# Patient Record
Sex: FEMALE | Race: WHITE | HISPANIC OR LATINO | Employment: UNEMPLOYED | URBAN - METROPOLITAN AREA
[De-identification: names, ages, dates, MRNs, and addresses within clinical notes are randomized per-mention and may not be internally consistent; named-entity substitution may affect disease eponyms.]

---

## 2023-04-04 DIAGNOSIS — F90.0 ATTENTION DEFICIT HYPERACTIVITY DISORDER (ADHD), PREDOMINANTLY INATTENTIVE TYPE: Primary | ICD-10-CM

## 2023-04-04 PROBLEM — F90.9 ADHD (ATTENTION DEFICIT HYPERACTIVITY DISORDER): Status: ACTIVE | Noted: 2023-04-04

## 2023-04-04 RX ORDER — LISDEXAMFETAMINE DIMESYLATE 60 MG/1
60 CAPSULE ORAL EVERY MORNING
Qty: 30 CAPSULE | Refills: 0 | Status: CANCELLED | OUTPATIENT
Start: 2023-04-04 | End: 2023-05-04

## 2023-04-04 NOTE — PROGRESS NOTES
She called on 3.24 wanting to switch back to Vyvanse due to side effects.  Methylphenidate was making her feel irritable and did not seem to be effective.  Before I saw this message I sent in a refill for the Focalin.  I will send in a refill for Vyvanse as well and have our staff work on the prior authorization for it as she would qualify.  I sent in a refill for her pregabalin which was picked up per my review of OARRS.  I personally reviewed the OARRS report for this patient and found no concern for abuse, dependence or diversion. Refill sent per pt request.

## 2023-04-10 ENCOUNTER — DOCUMENTATION (OUTPATIENT)
Dept: PRIMARY CARE | Facility: CLINIC | Age: 28
End: 2023-04-10
Payer: COMMERCIAL

## 2023-04-10 ENCOUNTER — TELEPHONE (OUTPATIENT)
Dept: PRIMARY CARE | Facility: CLINIC | Age: 28
End: 2023-04-10
Payer: COMMERCIAL

## 2023-04-12 ENCOUNTER — TELEPHONE (OUTPATIENT)
Dept: PHARMACY | Facility: HOSPITAL | Age: 28
End: 2023-04-12
Payer: COMMERCIAL

## 2023-04-12 NOTE — TELEPHONE ENCOUNTER
Prior authorization was submitted for Nhi Negrete on 4/12/23. Patient has been contacted for the status of their prior authorization.     Key: [id number]    Please contact clinical pharmacy with any further questions. Thank you.    Wlida Yuen, PharmD    Meds PGY1 Resident

## 2023-04-20 DIAGNOSIS — F90.9 ATTENTION DEFICIT HYPERACTIVITY DISORDER (ADHD), UNSPECIFIED ADHD TYPE: ICD-10-CM

## 2023-04-20 DIAGNOSIS — G54.0 BRACHIAL PLEXUS DISORDERS: ICD-10-CM

## 2023-04-20 RX ORDER — PREGABALIN 300 MG/1
300 CAPSULE ORAL 2 TIMES DAILY
Qty: 60 CAPSULE | Refills: 0 | Status: SHIPPED | OUTPATIENT
Start: 2023-04-20 | End: 2023-05-23

## 2023-04-20 RX ORDER — DEXMETHYLPHENIDATE HYDROCHLORIDE 30 MG/1
30 CAPSULE, EXTENDED RELEASE ORAL DAILY
Qty: 30 CAPSULE | Refills: 0 | Status: SHIPPED | OUTPATIENT
Start: 2023-04-20 | End: 2023-05-16 | Stop reason: ALTCHOICE

## 2023-04-20 NOTE — PROGRESS NOTES
Patient called requesting refills. I sent refills for dexmethylphenidate and lyrica. I personally reviewed the OARRS report for this patient and found no concern for abuse, dependence or diversion. Refill sent per pt request.  I am still trying to work on finding out what is going on with a PA that was sent to [employer] last week.

## 2023-04-21 ENCOUNTER — TELEPHONE (OUTPATIENT)
Dept: PRIMARY CARE | Facility: CLINIC | Age: 28
End: 2023-04-21
Payer: COMMERCIAL

## 2023-04-24 DIAGNOSIS — G54.0 BRACHIAL PLEXUS DISORDERS: ICD-10-CM

## 2023-05-11 RX ORDER — PREGABALIN 300 MG/1
CAPSULE ORAL
Qty: 54 CAPSULE | Refills: 1 | OUTPATIENT
Start: 2023-05-11

## 2023-05-16 ENCOUNTER — OFFICE VISIT (OUTPATIENT)
Dept: PRIMARY CARE | Facility: CLINIC | Age: 28
End: 2023-05-16
Payer: MEDICARE

## 2023-05-16 VITALS
DIASTOLIC BLOOD PRESSURE: 76 MMHG | WEIGHT: 171 LBS | HEART RATE: 76 BPM | SYSTOLIC BLOOD PRESSURE: 127 MMHG | HEIGHT: 62 IN | BODY MASS INDEX: 31.47 KG/M2

## 2023-05-16 DIAGNOSIS — F90.0 ATTENTION DEFICIT HYPERACTIVITY DISORDER (ADHD), PREDOMINANTLY INATTENTIVE TYPE: Primary | ICD-10-CM

## 2023-05-16 DIAGNOSIS — G83.24 PARALYSIS OF LEFT UPPER EXTREMITY (MULTI): ICD-10-CM

## 2023-05-16 DIAGNOSIS — F41.9 ANXIETY: ICD-10-CM

## 2023-05-16 PROCEDURE — 99214 OFFICE O/P EST MOD 30 MIN: CPT | Performed by: INTERNAL MEDICINE

## 2023-05-16 RX ORDER — MUPIROCIN 20 MG/G
OINTMENT TOPICAL
COMMUNITY
Start: 2022-08-05 | End: 2023-05-29 | Stop reason: ALTCHOICE

## 2023-05-16 RX ORDER — MELOXICAM 15 MG/1
TABLET ORAL
COMMUNITY
Start: 2023-01-06 | End: 2023-05-29 | Stop reason: ALTCHOICE

## 2023-05-16 RX ORDER — TRETINOIN 0.25 MG/G
GEL TOPICAL
COMMUNITY
Start: 2023-03-23 | End: 2023-08-21 | Stop reason: ALTCHOICE

## 2023-05-16 RX ORDER — KETOCONAZOLE 20 MG/ML
SHAMPOO, SUSPENSION TOPICAL
COMMUNITY
Start: 2021-11-15 | End: 2023-08-21 | Stop reason: ALTCHOICE

## 2023-05-16 RX ORDER — CEPHALEXIN 500 MG/1
1 CAPSULE ORAL
COMMUNITY
Start: 2022-08-05 | End: 2023-05-29 | Stop reason: ALTCHOICE

## 2023-05-16 RX ORDER — SULFAMETHOXAZOLE AND TRIMETHOPRIM 800; 160 MG/1; MG/1
1 TABLET ORAL 2 TIMES DAILY
COMMUNITY
Start: 2022-12-20 | End: 2023-05-29 | Stop reason: ALTCHOICE

## 2023-05-16 RX ORDER — SERTRALINE HYDROCHLORIDE 25 MG/1
1 TABLET, FILM COATED ORAL DAILY
COMMUNITY
Start: 2022-12-06 | End: 2023-08-21 | Stop reason: ALTCHOICE

## 2023-05-16 RX ORDER — DEXTROAMPHETAMINE SACCHARATE, AMPHETAMINE ASPARTATE MONOHYDRATE, DEXTROAMPHETAMINE SULFATE AND AMPHETAMINE SULFATE 5; 5; 5; 5 MG/1; MG/1; MG/1; MG/1
20 CAPSULE, EXTENDED RELEASE ORAL EVERY MORNING
Qty: 30 CAPSULE | Refills: 0 | Status: SHIPPED | OUTPATIENT
Start: 2023-05-16 | End: 2023-06-13 | Stop reason: ALTCHOICE

## 2023-05-16 NOTE — PROGRESS NOTES
Subjective   Nhi Negrete is a 27 y.o. female who presents for ADHD.  @[facility]@  She complains of being much more Irritable   Was getting headahces, and nausea , and experiencing dizziness  She feels this is all related to the dexmethylphenidate  She would like to change to a different medication  We tried multiple times for a PA for Vyvanse but it was not covered.     She has been feeling more anxious, feeling a bit more depressed.   She would like to begin a medication for this, although she is hesitant to .     She needs refill of her lyrica that she takes for her pain from brachial plexopathy.   OARRS:  No data recorded  I have personally reviewed the OARRS report for Nhi Negrete. I have considered the risks of abuse, dependence, addiction and diversion and I believe that it is clinically appropriate for Nhi Negrete to be prescribed this medication    Is the patient prescribed a combination of a benzodiazepine and opioid?  No    Last Urine Drug Screen / ordered today: No  Recent Results (from the past 55541 hour(s))   Amphetamine Confirm, Urine    Collection Time: 09/07/22 12:00 AM   Result Value Ref Range    Amphetamines,Urine 1,555 ng/mL    MDA, Urine <200 ng/mL    MDEA, Urine <200 ng/mL    MDMA, Urine <200 ng/mL    Methamphetamine Quant, Ur <200 ng/mL    Phentermine,Urine <200 ng/mL   Drug Screen, Urine With Reflex to Confirmation    Collection Time: 09/07/22 12:00 AM   Result Value Ref Range    DRUG SCREEN COMMENT URINE SEE BELOW     Amphetamine Screen, Urine PRESUMPTIVE POSITIVE (A) NEGATIVE    Barbiturate Screen, Urine PRESUMPTIVE NEGATIVE NEGATIVE    BENZODIAZEPINE (PRESENCE) IN URINE BY SCREEN METHOD PRESUMPTIVE NEGATIVE NEGATIVE    Cannabinoid Screen, Urine PRESUMPTIVE NEGATIVE NEGATIVE    Cocaine Screen, Urine PRESUMPTIVE NEGATIVE NEGATIVE    Fentanyl, Ur PRESUMPTIVE NEGATIVE NEGATIVE    Methadone Screen, Urine PRESUMPTIVE NEGATIVE NEGATIVE    Opiate Screen, Urine PRESUMPTIVE NEGATIVE  "NEGATIVE    Oxycodone Screen, Ur PRESUMPTIVE NEGATIVE NEGATIVE    PCP Screen, Urine PRESUMPTIVE NEGATIVE NEGATIVE     Results are as expected.     Controlled Substance Agreement:  Date of the Last Agreement: 10/05/2022  Reviewed Controlled Substance Agreement including but not limited to the benefits, risks, and alternatives to treatment with a Controlled Substance medication(s).    Stimulants:   What is the patient's goal of therapy? Treatment of ADHD symptoms  Is this being achieved with current treatment? yes    Activities of Daily Living:   Is your overall impression that this patient is benefiting (symptom reduction outweighs side effects) from stimulant therapy? Yes     1. Physical Functioning: Same  2. Family Relationship: Same  3. Social Relationship: Same  4. Mood: Worse: notes this since has been on dexmehylphenidate   5. Sleep Patterns: Same  6. Overall Function: Same    Review of Systems    Objective   /76   Pulse 76   Ht 1.575 m (5' 2\")   Wt 77.6 kg (171 lb)   BMI 31.28 kg/m²    Physical Exam  Visit Vitals  /76   Pulse 76   Ht 1.575 m (5' 2\")   Wt 77.6 kg (171 lb)   BMI 31.28 kg/m²   Smoking Status Some Days   BSA 1.84 m²      GEN: NAD  HEENT: normal  NECK: no adenopathy, no thyroid enlargment  LUNGS: CTAB  CV: reg S1/S2 no murmurs  EXT: no leg edema   Assessment/Plan   Problem List Items Addressed This Visit    None    ADHD adult: begin adderall Xr 20 mg. Told her she may need a higher dose. Recommended she contact me about her progress in 2 weeks over My Chart.   Anxiety/depression\" begin sertraline 25 mg once daily.   Left Brachial Plexopathy with chronic pain : remain on pregabalin. Radha can see me in office again in 3 months.      "

## 2023-05-16 NOTE — PATIENT INSTRUCTIONS
We will begin Adderal XR (amphetamine/dextroamphetamine) at 20 mg.  You may need a higher dose.  Pay attention to how often you are getting agitated or loosing focus.   Let me know how you are doing in 2 weeks over the MyChart.   Try using flonase or nasacort nose spray for ear symptoms.   See me again in 3 months.

## 2023-05-29 PROBLEM — G83.24: Status: ACTIVE | Noted: 2023-05-29

## 2023-05-29 PROBLEM — S52.502A CLOSED FRACTURE OF LEFT DISTAL RADIUS: Status: RESOLVED | Noted: 2019-09-19 | Resolved: 2023-05-29

## 2023-06-13 ENCOUNTER — TELEPHONE (OUTPATIENT)
Dept: PRIMARY CARE | Facility: CLINIC | Age: 28
End: 2023-06-13
Payer: MEDICARE

## 2023-06-13 DIAGNOSIS — F90.0 ATTENTION DEFICIT HYPERACTIVITY DISORDER (ADHD), PREDOMINANTLY INATTENTIVE TYPE: Primary | ICD-10-CM

## 2023-06-13 RX ORDER — DEXTROAMPHETAMINE SACCHARATE, AMPHETAMINE ASPARTATE MONOHYDRATE, DEXTROAMPHETAMINE SULFATE AND AMPHETAMINE SULFATE 7.5; 7.5; 7.5; 7.5 MG/1; MG/1; MG/1; MG/1
30 CAPSULE, EXTENDED RELEASE ORAL EVERY MORNING
Qty: 30 CAPSULE | Refills: 0 | Status: SHIPPED | OUTPATIENT
Start: 2023-06-13 | End: 2023-06-15 | Stop reason: WASHOUT

## 2023-06-15 ENCOUNTER — TELEPHONE (OUTPATIENT)
Dept: PHARMACY | Facility: HOSPITAL | Age: 28
End: 2023-06-15
Payer: MEDICARE

## 2023-06-15 RX ORDER — DEXTROAMPHETAMINE SACCHARATE, AMPHETAMINE ASPARTATE, DEXTROAMPHETAMINE SULFATE AND AMPHETAMINE SULFATE 3.75; 3.75; 3.75; 3.75 MG/1; MG/1; MG/1; MG/1
15 TABLET ORAL DAILY
Qty: 30 TABLET | Refills: 0 | Status: SHIPPED | OUTPATIENT
Start: 2023-06-15 | End: 2023-06-15 | Stop reason: WASHOUT

## 2023-06-15 RX ORDER — DEXTROAMPHETAMINE SACCHARATE, AMPHETAMINE ASPARTATE, DEXTROAMPHETAMINE SULFATE AND AMPHETAMINE SULFATE 2.5; 2.5; 2.5; 2.5 MG/1; MG/1; MG/1; MG/1
TABLET ORAL
Qty: 90 TABLET | Refills: 0 | Status: SHIPPED | OUTPATIENT
Start: 2023-06-15 | End: 2023-08-21 | Stop reason: ALTCHOICE

## 2023-06-15 NOTE — TELEPHONE ENCOUNTER
----- Message from Radhika Stearns DO sent at 6/15/2023  1:44 PM EDT -----  Regarding: FW: Your Recent Visit  Contact: [phone]  Call her and tell her that I changed her rx to the immediate release adderall. She should start out with two pills in the morning and can take one pill by mid afternoon , 2 or 3 pm . Tell her the pharmacists are working on a PA for her vyvanse.   ----- Message -----  From: Joyce Michele MA  Sent: 6/13/2023  11:33 AM EDT  To: Radhika Stearns DO  Subject: FW: Your Recent Visit                              ----- Message -----  From: Nhi Negrete  Sent: 6/13/2023   9:19 AM EDT  To: #  Subject: Your Recent Visit                                Good morning.   Can you please confirm if you’ve received my message and provide any update?   Thank you

## 2023-06-16 ENCOUNTER — TELEPHONE (OUTPATIENT)
Dept: PHARMACY | Facility: HOSPITAL | Age: 28
End: 2023-06-16
Payer: MEDICARE

## 2023-06-16 NOTE — TELEPHONE ENCOUNTER
Nhi Negrete has been approved for prior authorization for Vyvanse 60mg  . Patient has been contacted/left a voicemail for the status of their prior authorization.     Key: [id number]   Approval Duration: 1 year   Date of expiration:  6/14/024   I will notify Dr. Stearns so that she can send a prescription in for the patient.     Please contact clinical pharmacy with any further questions. Thank you.    Berna Stein, PharmD  Clinical Pharmacist  [phone]

## 2023-06-19 DIAGNOSIS — F90.9 ATTENTION DEFICIT HYPERACTIVITY DISORDER (ADHD), UNSPECIFIED ADHD TYPE: ICD-10-CM

## 2023-06-19 DIAGNOSIS — G54.0 BRACHIAL PLEXUS DISORDERS: ICD-10-CM

## 2023-06-19 RX ORDER — LISDEXAMFETAMINE DIMESYLATE 60 MG/1
60 CAPSULE ORAL DAILY
Qty: 30 CAPSULE | Refills: 0 | Status: SHIPPED | OUTPATIENT
Start: 2023-06-19 | End: 2023-07-18 | Stop reason: SDUPTHER

## 2023-06-19 RX ORDER — PREGABALIN 300 MG/1
300 CAPSULE ORAL 2 TIMES DAILY
Qty: 60 CAPSULE | Refills: 5 | Status: SHIPPED | OUTPATIENT
Start: 2023-06-19 | End: 2023-09-14

## 2023-06-19 NOTE — PROGRESS NOTES
I personally reviewed the OARRS report for this patient and found no concern for abuse, dependence or diversion. Her PA was approved for Vyvanse so RX was submitted . Refill also sent for lyrica and she has been stable on this med for some time so refills were submitted. Refill sent per pt request.

## 2023-07-18 DIAGNOSIS — F90.9 ATTENTION DEFICIT HYPERACTIVITY DISORDER (ADHD), UNSPECIFIED ADHD TYPE: ICD-10-CM

## 2023-07-18 RX ORDER — LISDEXAMFETAMINE DIMESYLATE 60 MG/1
60 CAPSULE ORAL DAILY
Qty: 30 CAPSULE | Refills: 0 | Status: SHIPPED | OUTPATIENT
Start: 2023-07-18 | End: 2023-08-18 | Stop reason: SDUPTHER

## 2023-07-19 NOTE — TELEPHONE ENCOUNTER
Pt needs refill if lisdexamfetamine 60  personally reviewed the OARRS report for this patient and found no concern for abuse, dependence or diversion. Refill sent today.

## 2023-08-18 DIAGNOSIS — F90.9 ATTENTION DEFICIT HYPERACTIVITY DISORDER (ADHD), UNSPECIFIED ADHD TYPE: ICD-10-CM

## 2023-08-18 RX ORDER — LISDEXAMFETAMINE DIMESYLATE 60 MG/1
60 CAPSULE ORAL DAILY
Qty: 30 CAPSULE | Refills: 0 | Status: SHIPPED | OUTPATIENT
Start: 2023-08-18 | End: 2023-08-21 | Stop reason: ALTCHOICE

## 2023-08-18 NOTE — TELEPHONE ENCOUNTER
Pt reqested refill of generic vyvanse. I personally reviewed the OARRS report for this patient and found no concern for abuse, dependence or diversion. Refill sent today.

## 2023-08-21 ENCOUNTER — OFFICE VISIT (OUTPATIENT)
Dept: PRIMARY CARE | Facility: CLINIC | Age: 28
End: 2023-08-21
Payer: MEDICARE

## 2023-08-21 ENCOUNTER — APPOINTMENT (OUTPATIENT)
Dept: PRIMARY CARE | Facility: CLINIC | Age: 28
End: 2023-08-21
Payer: MEDICARE

## 2023-08-21 VITALS
SYSTOLIC BLOOD PRESSURE: 93 MMHG | RESPIRATION RATE: 16 BRPM | HEIGHT: 62 IN | DIASTOLIC BLOOD PRESSURE: 38 MMHG | BODY MASS INDEX: 32.06 KG/M2 | HEART RATE: 82 BPM | WEIGHT: 174.2 LBS

## 2023-08-21 DIAGNOSIS — Z79.899 ENCOUNTER FOR MEDICATION MANAGEMENT: Primary | ICD-10-CM

## 2023-08-21 DIAGNOSIS — G54.0 BRACHIAL PLEXUS NEUROPATHY: ICD-10-CM

## 2023-08-21 DIAGNOSIS — F90.9 ATTENTION DEFICIT HYPERACTIVITY DISORDER (ADHD), UNSPECIFIED ADHD TYPE: ICD-10-CM

## 2023-08-21 PROCEDURE — 99214 OFFICE O/P EST MOD 30 MIN: CPT | Performed by: INTERNAL MEDICINE

## 2023-08-21 RX ORDER — LISDEXAMFETAMINE DIMESYLATE 60 MG/1
60 CAPSULE ORAL DAILY
Qty: 30 CAPSULE | Refills: 0 | Status: SHIPPED | OUTPATIENT
Start: 2023-08-21 | End: 2023-08-24 | Stop reason: SDUPTHER

## 2023-08-21 ASSESSMENT — PATIENT HEALTH QUESTIONNAIRE - PHQ9
1. LITTLE INTEREST OR PLEASURE IN DOING THINGS: SEVERAL DAYS
2. FEELING DOWN, DEPRESSED OR HOPELESS: SEVERAL DAYS
SUM OF ALL RESPONSES TO PHQ9 QUESTIONS 1 AND 2: 2

## 2023-08-21 ASSESSMENT — PAIN SCALES - GENERAL: PAINLEVEL: 8

## 2023-08-21 NOTE — PROGRESS NOTES
Subjective   Nhi Negrete is a 28 y.o. female who presents for Follow-up.    Here for follow up on vyvanse  Says that her pregaballin does not need a new rx yet    Says antonio castañeda was having trouble getting Vyvanse so wants it sent to the [facility] in [place]     OARRS:  Radhika Stearns, DO on 8/24/2023  2:48 PM  I have personally reviewed the OARRS report for Nhi Negrete. I have considered the risks of abuse, dependence, addiction and diversion and I believe that it is clinically appropriate for Nhi Negrete to be prescribed this medication    Is the patient prescribed a combination of a benzodiazepine and opioid?  No    Last Urine Drug Screen / ordered today: Yes  Recent Results (from the past 8760 hour(s))   Amphetamine Confirm, Urine    Collection Time: 09/07/22 12:00 AM   Result Value Ref Range    Amphetamines,Urine 1,555 ng/mL    MDA, Urine <200 ng/mL    MDEA, Urine <200 ng/mL    MDMA, Urine <200 ng/mL    Methamphetamine Quant, Ur <200 ng/mL    Phentermine,Urine <200 ng/mL   Drug Screen, Urine With Reflex to Confirmation    Collection Time: 09/07/22 12:00 AM   Result Value Ref Range    DRUG SCREEN COMMENT URINE SEE BELOW     Amphetamine Screen, Urine PRESUMPTIVE POSITIVE (A) NEGATIVE    Barbiturate Screen, Urine PRESUMPTIVE NEGATIVE NEGATIVE    BENZODIAZEPINE (PRESENCE) IN URINE BY SCREEN METHOD PRESUMPTIVE NEGATIVE NEGATIVE    Cannabinoid Screen, Urine PRESUMPTIVE NEGATIVE NEGATIVE    Cocaine Screen, Urine PRESUMPTIVE NEGATIVE NEGATIVE    Fentanyl, Ur PRESUMPTIVE NEGATIVE NEGATIVE    Methadone Screen, Urine PRESUMPTIVE NEGATIVE NEGATIVE    Opiate Screen, Urine PRESUMPTIVE NEGATIVE NEGATIVE    Oxycodone Screen, Ur PRESUMPTIVE NEGATIVE NEGATIVE    PCP Screen, Urine PRESUMPTIVE NEGATIVE NEGATIVE     Results are as expected.     Controlled Substance Agreement:  Date of the Last Agreement: 8/21/2023  Reviewed Controlled Substance Agreement including but not limited to the benefits, risks, and alternatives  "to treatment with a Controlled Substance medication(s).    Stimulants:   What is the patient's goal of therapy? Treatment of ADHD   Is this being achieved with current treatment?     Activities of Daily Living:   Is your overall impression that this patient is benefiting (symptom reduction outweighs side effects) from stimulant therapy? Yes     1. Physical Functioning: Same  2. Family Relationship: Same  3. Social Relationship: Same  4. Mood: Better  5. Sleep Patterns: Same  6. Overall Function: Better    Review of Systems    Objective   BP (!) 93/38 (BP Location: Right arm, Patient Position: Sitting, BP Cuff Size: Adult)   Pulse 82   Resp 16   Ht 1.575 m (5' 2\")   Wt 79 kg (174 lb 3.2 oz)   BMI 31.86 kg/m²    Physical Exam    Assessment/Plan   Problem List Items Addressed This Visit       ADHD (attention deficit hyperactivity disorder)   refilled vyvanse. CSA completed today and also UDS ordered.     Brachial plexopathy: remains on pregabalin.      Other Visit Diagnoses       Encounter for medication management    -  Primary    Relevant Orders    Opiate/Opioid/Benzo Extended Prescription Compliance               "

## 2023-08-21 NOTE — PATIENT INSTRUCTIONS
Lab closes from 12:30-1:00 for lunch daily.  Your order will be in the system to do your urine test.  See me in 6 months.

## 2023-08-24 DIAGNOSIS — F90.9 ATTENTION DEFICIT HYPERACTIVITY DISORDER (ADHD), UNSPECIFIED ADHD TYPE: ICD-10-CM

## 2023-08-24 RX ORDER — LISDEXAMFETAMINE DIMESYLATE 60 MG/1
60 CAPSULE ORAL DAILY
Qty: 30 CAPSULE | Refills: 0 | Status: SHIPPED | OUTPATIENT
Start: 2023-08-24 | End: 2023-09-26 | Stop reason: SDUPTHER

## 2023-08-24 NOTE — PROGRESS NOTES
She called and needs her prescription for Vyvanse transferred to [facility] pharmacy in [facility] [place].  I personally reviewed the OARRS report for this patient and found no concern for abuse, dependence or diversion.  I will send another prescription in for her Vyvanse 60 mg #30  daily to this new pharmacy.

## 2023-09-14 DIAGNOSIS — G54.0 BRACHIAL PLEXUS DISORDERS: ICD-10-CM

## 2023-09-14 RX ORDER — PREGABALIN 300 MG/1
300 CAPSULE ORAL 2 TIMES DAILY
Qty: 60 CAPSULE | Refills: 3 | Status: SHIPPED | OUTPATIENT
Start: 2023-09-14 | End: 2023-10-11

## 2023-09-14 NOTE — TELEPHONE ENCOUNTER
She is requesting refill of lyrica. I personally reviewed the OARRS report for this patient and found no concern for abuse, dependence or diversion. Refill sent today.

## 2023-09-26 DIAGNOSIS — F90.9 ATTENTION DEFICIT HYPERACTIVITY DISORDER (ADHD), UNSPECIFIED ADHD TYPE: ICD-10-CM

## 2023-09-26 RX ORDER — LISDEXAMFETAMINE DIMESYLATE 60 MG/1
60 CAPSULE ORAL DAILY
Qty: 30 CAPSULE | Refills: 0 | Status: SHIPPED | OUTPATIENT
Start: 2023-09-26 | End: 2023-11-22 | Stop reason: SDUPTHER

## 2023-09-26 NOTE — TELEPHONE ENCOUNTER
Med refill from voicemail      lisdexamfetamine (Vyvanse) 60 mg capsule   Take 1 capsule (60 mg) by mouth once daily.     30 days    [facility]    BN

## 2023-09-26 NOTE — TELEPHONE ENCOUNTER
Pt requesting refill of vyvanse. I personally reviewed the OARRS report for this patient and found no concern for abuse, dependence or diversion. Refill sent today.

## 2023-10-11 DIAGNOSIS — G54.0 BRACHIAL PLEXUS DISORDERS: ICD-10-CM

## 2023-10-11 RX ORDER — PREGABALIN 300 MG/1
300 CAPSULE ORAL 2 TIMES DAILY
Qty: 60 CAPSULE | Refills: 3 | Status: SHIPPED | OUTPATIENT
Start: 2023-10-11 | End: 2023-11-09

## 2023-10-12 NOTE — TELEPHONE ENCOUNTER
Pt requests refill of pregabalin. I personally reviewed the OARRS report for this patient and found no concern for abuse, dependence or diversion. Refill sent today.

## 2023-11-08 DIAGNOSIS — G54.0 BRACHIAL PLEXUS DISORDERS: ICD-10-CM

## 2023-11-08 NOTE — TELEPHONE ENCOUNTER
Please refill lyrica, patient needs/approves.    pregabalin (Lyrica) 300 mg capsule   TAKE 1 CAPSULE BY MOUTH TWICE A DAY   [facility]

## 2023-11-09 RX ORDER — PREGABALIN 300 MG/1
300 CAPSULE ORAL 2 TIMES DAILY
Qty: 60 CAPSULE | Refills: 3 | Status: SHIPPED | OUTPATIENT
Start: 2023-11-09 | End: 2024-03-04

## 2023-11-22 DIAGNOSIS — F90.9 ATTENTION DEFICIT HYPERACTIVITY DISORDER (ADHD), UNSPECIFIED ADHD TYPE: ICD-10-CM

## 2023-11-22 RX ORDER — LISDEXAMFETAMINE DIMESYLATE 60 MG/1
60 CAPSULE ORAL DAILY
Qty: 30 CAPSULE | Refills: 0 | Status: SHIPPED | OUTPATIENT
Start: 2023-11-22 | End: 2024-01-04 | Stop reason: SDUPTHER

## 2023-11-22 NOTE — TELEPHONE ENCOUNTER
Patient requesting refill for lisdexamfetamine.  I personally reviewed the OARRS report for this patient and found no concern for abuse, dependence or diversion. Refill sent today.

## 2023-11-22 NOTE — TELEPHONE ENCOUNTER
Please refill.    lisdexamfetamine (Vyvanse) 60 mg capsule   Take 1 capsule (60 mg) by mouth once daily.   [facility]

## 2024-01-04 DIAGNOSIS — F90.9 ATTENTION DEFICIT HYPERACTIVITY DISORDER (ADHD), UNSPECIFIED ADHD TYPE: ICD-10-CM

## 2024-01-04 RX ORDER — LISDEXAMFETAMINE DIMESYLATE 60 MG/1
60 CAPSULE ORAL DAILY
Qty: 30 CAPSULE | Refills: 0 | Status: SHIPPED | OUTPATIENT
Start: 2024-01-04 | End: 2024-01-05 | Stop reason: SDUPTHER

## 2024-01-04 NOTE — TELEPHONE ENCOUNTER
Pt requesting refill of generic vyvanse. I personally reviewed the OARRS report for this patient and found no concern for abuse, dependence or diversion. Refill sent per pt request.

## 2024-01-04 NOTE — TELEPHONE ENCOUNTER
Please refill.    lisdexamfetamine (Vyvanse) 60 mg capsule   Take 1 capsule (60 mg) by mouth once daily   [facility]  [facility]

## 2024-01-05 ENCOUNTER — PATIENT MESSAGE (OUTPATIENT)
Dept: PRIMARY CARE | Facility: CLINIC | Age: 29
End: 2024-01-05
Payer: MEDICARE

## 2024-01-05 DIAGNOSIS — F90.9 ATTENTION DEFICIT HYPERACTIVITY DISORDER (ADHD), UNSPECIFIED ADHD TYPE: ICD-10-CM

## 2024-01-05 RX ORDER — LISDEXAMFETAMINE DIMESYLATE 60 MG/1
60 CAPSULE ORAL DAILY
Qty: 30 CAPSULE | Refills: 0 | Status: SHIPPED | OUTPATIENT
Start: 2024-01-05 | End: 2024-01-17 | Stop reason: SDUPTHER

## 2024-01-05 NOTE — TELEPHONE ENCOUNTER
From: Nhi Negrete  To: Radhika Stearns, DO  Sent: 1/5/2024 1:53 PM EST  Subject: Refill pharmacy change    [facility] Dr. Stearns!     Could you [facility] [place] [facility]? They are out of stock in [place].     Thank you,   Nhi

## 2024-01-17 DIAGNOSIS — F90.9 ATTENTION DEFICIT HYPERACTIVITY DISORDER (ADHD), UNSPECIFIED ADHD TYPE: ICD-10-CM

## 2024-01-17 RX ORDER — LISDEXAMFETAMINE DIMESYLATE 60 MG/1
60 CAPSULE ORAL DAILY
Qty: 30 CAPSULE | Refills: 0 | Status: SHIPPED | OUTPATIENT
Start: 2024-01-17 | End: 2024-02-19 | Stop reason: SDUPTHER

## 2024-01-17 NOTE — PROGRESS NOTES
Pt needs RX sent for Vyvanse to [facility] due to availability. I personally reviewed the OARRS report for this patient and found no concern for abuse, dependence or diversion. Refill sent per pt request.

## 2024-02-19 ENCOUNTER — APPOINTMENT (OUTPATIENT)
Dept: PRIMARY CARE | Facility: CLINIC | Age: 29
End: 2024-02-19
Payer: MEDICARE

## 2024-02-19 DIAGNOSIS — F90.9 ATTENTION DEFICIT HYPERACTIVITY DISORDER (ADHD), UNSPECIFIED ADHD TYPE: ICD-10-CM

## 2024-02-19 RX ORDER — LISDEXAMFETAMINE DIMESYLATE 60 MG/1
60 CAPSULE ORAL DAILY
Qty: 30 CAPSULE | Refills: 0 | Status: SHIPPED | OUTPATIENT
Start: 2024-02-19 | End: 2024-03-29 | Stop reason: SDUPTHER

## 2024-02-19 NOTE — TELEPHONE ENCOUNTER
Pt requesting refill of lisdexamfetamine. I personally reviewed the OARRS report for this patient and found no concern for abuse, dependence or diversion. Refill sent per pt request.

## 2024-02-21 ENCOUNTER — TELEPHONE (OUTPATIENT)
Dept: PRIMARY CARE | Facility: CLINIC | Age: 29
End: 2024-02-21
Payer: MEDICARE

## 2024-03-02 DIAGNOSIS — G54.0 BRACHIAL PLEXUS DISORDERS: ICD-10-CM

## 2024-03-04 RX ORDER — PREGABALIN 300 MG/1
300 CAPSULE ORAL 2 TIMES DAILY
Qty: 60 CAPSULE | Refills: 1 | Status: SHIPPED | OUTPATIENT
Start: 2024-03-04 | End: 2024-04-30 | Stop reason: SDUPTHER

## 2024-03-06 ENCOUNTER — OFFICE VISIT (OUTPATIENT)
Dept: PRIMARY CARE | Facility: CLINIC | Age: 29
End: 2024-03-06
Payer: MEDICARE

## 2024-03-06 VITALS
WEIGHT: 182 LBS | HEART RATE: 84 BPM | SYSTOLIC BLOOD PRESSURE: 110 MMHG | HEIGHT: 62 IN | BODY MASS INDEX: 33.49 KG/M2 | DIASTOLIC BLOOD PRESSURE: 65 MMHG | RESPIRATION RATE: 16 BRPM

## 2024-03-06 DIAGNOSIS — M54.31 SCIATICA OF RIGHT SIDE: ICD-10-CM

## 2024-03-06 DIAGNOSIS — G83.24 PARALYSIS OF LEFT UPPER EXTREMITY (MULTI): ICD-10-CM

## 2024-03-06 DIAGNOSIS — F90.0 ATTENTION DEFICIT HYPERACTIVITY DISORDER (ADHD), PREDOMINANTLY INATTENTIVE TYPE: Primary | ICD-10-CM

## 2024-03-06 PROCEDURE — 99214 OFFICE O/P EST MOD 30 MIN: CPT | Performed by: INTERNAL MEDICINE

## 2024-03-06 ASSESSMENT — PAIN SCALES - GENERAL: PAINLEVEL: 0-NO PAIN

## 2024-03-06 ASSESSMENT — PATIENT HEALTH QUESTIONNAIRE - PHQ9
1. LITTLE INTEREST OR PLEASURE IN DOING THINGS: NOT AT ALL
2. FEELING DOWN, DEPRESSED OR HOPELESS: NOT AT ALL
SUM OF ALL RESPONSES TO PHQ9 QUESTIONS 1 AND 2: 0

## 2024-03-06 NOTE — PROGRESS NOTES
"Subjective   Nhi Negrete is a 28 y.o. female who presents for Follow-up.    She has been having right leg numbness: mainly in her right foot  She gets numbness in her hip   Trying to sleep with a pillow between her knees  Feels is related to her over reliacne on her right side  She is also sitting most of the time at work    She goes to [facility] chiropract[facility] and they helped her a lot in the past  She says she wants to have another xray by the chiropractor    She is frustrtated that she has gained weight    She was able to get lisdexamfetamine through her insurance; the pharmacy was using her old insurance by accident    She has to get a colposcopy  She [facility] gyn [facility] in [place]    Review of Systems    Objective   /65   Pulse 84   Resp 16   Ht 1.575 m (5' 2\")   Wt 82.6 kg (182 lb)   BMI 33.29 kg/m²    Physical Exam  Visit Vitals  /65   Pulse 84   Resp 16   Ht 1.575 m (5' 2\")   Wt 82.6 kg (182 lb)   BMI 33.29 kg/m²   Smoking Status Some Days   BSA 1.9 m²      GEN: NAD  HEENT: normal  NECK: no adenopathy, no thyroid enlargment  LUNGS: CTAB  CV: reg S1/S2 no murmurs  EXT: no leg edema   Assessment/Plan   Problem List Items Addressed This Visit       ADHD (attention deficit hyperactivity disorder) - remain on lisdexamfetamine. She did not need a refill today. One was recently sent.     Paralysis of left upper extremity (CMS/HCC)     Remain on pregabalin 300 twice daily . She did not need a refill today. One was recently sent.           Other Visit Diagnoses       Sciatica of right side    she wants to go back to the chiropractor first. Is hopeful he will do another xray as she is noticing symptoms more often.  She had a somewhat negative experience at another physical therapy practice, and feels more comfortable with the chiropractor.     See me in 6 mo with urine drug test at that time, to keep the tests about one year apart.                "

## 2024-03-06 NOTE — PATIENT INSTRUCTIONS
See me again in 6 months.    Will do urine at that time.     Call if you need physical therapy referral.

## 2024-03-29 DIAGNOSIS — F90.9 ATTENTION DEFICIT HYPERACTIVITY DISORDER (ADHD), UNSPECIFIED ADHD TYPE: ICD-10-CM

## 2024-03-29 NOTE — TELEPHONE ENCOUNTER
Med refill      lisdexamfetamine (Vyvanse) 60 mg capsule   Take 1 capsule (60 mg) by mouth once daily.     [facility]

## 2024-04-01 RX ORDER — LISDEXAMFETAMINE DIMESYLATE 60 MG/1
60 CAPSULE ORAL DAILY
Qty: 30 CAPSULE | Refills: 0 | Status: SHIPPED | OUTPATIENT
Start: 2024-04-01 | End: 2024-04-30 | Stop reason: SDUPTHER

## 2024-04-01 NOTE — TELEPHONE ENCOUNTER
Med refill      lisdexamfetamine (Vyvanse) 60 mg capsule    Take 1 capsule (60 mg) by mouth once daily.     [facility]

## 2024-04-30 DIAGNOSIS — F90.9 ATTENTION DEFICIT HYPERACTIVITY DISORDER (ADHD), UNSPECIFIED ADHD TYPE: ICD-10-CM

## 2024-04-30 DIAGNOSIS — G54.0 BRACHIAL PLEXUS DISORDERS: ICD-10-CM

## 2024-04-30 NOTE — TELEPHONE ENCOUNTER
Please refill.    pregabalin (Lyrica) 300 mg capsule   TAKE 1 CAPSULE BY MOUTH TWICE A DAY   [facility]    lisdexamfetamine (Vyvanse) 60 mg capsule   Take 1 capsule (60 mg) by mouth once daily   [facility]

## 2024-05-01 RX ORDER — LISDEXAMFETAMINE DIMESYLATE 60 MG/1
60 CAPSULE ORAL DAILY
Qty: 30 CAPSULE | Refills: 0 | Status: SHIPPED | OUTPATIENT
Start: 2024-05-01 | End: 2024-06-06 | Stop reason: SDUPTHER

## 2024-05-01 RX ORDER — PREGABALIN 300 MG/1
300 CAPSULE ORAL 2 TIMES DAILY
Qty: 60 CAPSULE | Refills: 0 | Status: SHIPPED | OUTPATIENT
Start: 2024-05-01 | End: 2024-05-29 | Stop reason: SDUPTHER

## 2024-05-29 ENCOUNTER — PATIENT MESSAGE (OUTPATIENT)
Dept: PRIMARY CARE | Facility: CLINIC | Age: 29
End: 2024-05-29
Payer: MEDICARE

## 2024-05-29 DIAGNOSIS — G54.0 BRACHIAL PLEXUS DISORDERS: ICD-10-CM

## 2024-05-29 RX ORDER — PREGABALIN 300 MG/1
300 CAPSULE ORAL 2 TIMES DAILY
Qty: 60 CAPSULE | Refills: 0 | Status: SHIPPED | OUTPATIENT
Start: 2024-05-29

## 2024-05-29 NOTE — TELEPHONE ENCOUNTER
Pt requesting refill for lyrica. I personally reviewed the OARRS report for this patient and found no concern for abuse, dependence or diversion. Refill sent today.

## 2024-05-29 NOTE — TELEPHONE ENCOUNTER
From: Nhi Negrete  To: Radhika Stearns  Sent: 5/29/2024 10:24 AM EDT  Subject: Lyrica refill    Heelliott Stearns,    I called in for a refill and was cut off on the phone before confirming pharmacy. [facility] [place] [facility]!

## 2024-06-06 DIAGNOSIS — F90.9 ATTENTION DEFICIT HYPERACTIVITY DISORDER (ADHD), UNSPECIFIED ADHD TYPE: ICD-10-CM

## 2024-06-06 RX ORDER — LISDEXAMFETAMINE DIMESYLATE 60 MG/1
60 CAPSULE ORAL DAILY
Qty: 30 CAPSULE | Refills: 0 | Status: SHIPPED | OUTPATIENT
Start: 2024-06-06 | End: 2024-07-06

## 2024-06-06 NOTE — TELEPHONE ENCOUNTER
Pt requesting refill for lisdexamfetamine . I  personally reviewed the OARRS report for this patient and found no concern for abuse, dependence or diversion. Refill sent today.

## 2024-06-27 DIAGNOSIS — G54.0 BRACHIAL PLEXUS DISORDERS: ICD-10-CM

## 2024-06-27 RX ORDER — PREGABALIN 300 MG/1
300 CAPSULE ORAL 2 TIMES DAILY
Qty: 60 CAPSULE | Refills: 0 | Status: SHIPPED | OUTPATIENT
Start: 2024-06-27

## 2024-07-09 DIAGNOSIS — F90.9 ATTENTION DEFICIT HYPERACTIVITY DISORDER (ADHD), UNSPECIFIED ADHD TYPE: ICD-10-CM

## 2024-07-09 RX ORDER — LISDEXAMFETAMINE DIMESYLATE 60 MG/1
60 CAPSULE ORAL DAILY
Qty: 30 CAPSULE | Refills: 0 | Status: SHIPPED | OUTPATIENT
Start: 2024-07-09 | End: 2024-08-08

## 2024-07-10 NOTE — TELEPHONE ENCOUNTER
She requests refill of generic Vyvanse I personally reviewed the OARRS report for this patient and found no concern for abuse, dependence or diversion. Refill sent per pt request.

## 2024-07-29 DIAGNOSIS — G54.0 BRACHIAL PLEXUS DISORDERS: ICD-10-CM

## 2024-07-29 RX ORDER — PREGABALIN 300 MG/1
300 CAPSULE ORAL 2 TIMES DAILY
Qty: 60 CAPSULE | Refills: 2 | Status: SHIPPED | OUTPATIENT
Start: 2024-07-29

## 2024-07-29 NOTE — TELEPHONE ENCOUNTER
I personally reviewed the OARRS report for this patient and found no concern for abuse, dependence or diversion. Refill sent per pt request.   She has her next appt with me scheduled 9/4/2024.

## 2024-08-09 DIAGNOSIS — F90.9 ATTENTION DEFICIT HYPERACTIVITY DISORDER (ADHD), UNSPECIFIED ADHD TYPE: ICD-10-CM

## 2024-08-09 RX ORDER — LISDEXAMFETAMINE DIMESYLATE 60 MG/1
60 CAPSULE ORAL DAILY
Qty: 30 CAPSULE | Refills: 0 | Status: SHIPPED | OUTPATIENT
Start: 2024-08-09 | End: 2024-09-08

## 2024-08-09 NOTE — TELEPHONE ENCOUNTER
Please refill    lisdexamfetamine (Vyvanse) 60 mg capsule   60 mg, Daily   [facility]

## 2024-08-09 NOTE — TELEPHONE ENCOUNTER
Pt requesting refill of lisdexamphetamine.  personally reviewed the OARRS report for this patient and found no concern for abuse, dependence or diversion. Refill sent per pt request.

## 2024-08-10 ENCOUNTER — PATIENT MESSAGE (OUTPATIENT)
Dept: PRIMARY CARE | Facility: CLINIC | Age: 29
End: 2024-08-10
Payer: MEDICARE

## 2024-08-10 DIAGNOSIS — G54.0 BRACHIAL PLEXUS DISORDERS: ICD-10-CM

## 2024-08-10 DIAGNOSIS — F90.9 ATTENTION DEFICIT HYPERACTIVITY DISORDER (ADHD), UNSPECIFIED ADHD TYPE: ICD-10-CM

## 2024-08-12 RX ORDER — LISDEXAMFETAMINE DIMESYLATE 60 MG/1
60 CAPSULE ORAL DAILY
Qty: 30 CAPSULE | Refills: 0 | Status: SHIPPED | OUTPATIENT
Start: 2024-08-12 | End: 2024-08-12 | Stop reason: SDUPTHER

## 2024-08-13 RX ORDER — LISDEXAMFETAMINE DIMESYLATE 60 MG/1
60 CAPSULE ORAL DAILY
Qty: 30 CAPSULE | Refills: 0 | Status: SHIPPED | OUTPATIENT
Start: 2024-08-13 | End: 2024-08-15 | Stop reason: SDUPTHER

## 2024-08-13 NOTE — PATIENT COMMUNICATION
Nhi called and stated:    She has called numerous pharmacies around the area and the Vyvanse are all on back order and not sure when they will have it back in stock.  She is wondering if theres an alternative to Vyvanse she can try until then  Please advise

## 2024-08-15 RX ORDER — LISDEXAMFETAMINE DIMESYLATE 60 MG/1
60 CAPSULE ORAL DAILY
Qty: 30 CAPSULE | Refills: 0 | Status: SHIPPED | OUTPATIENT
Start: 2024-08-15 | End: 2024-09-14

## 2024-09-04 ENCOUNTER — APPOINTMENT (OUTPATIENT)
Dept: PRIMARY CARE | Facility: CLINIC | Age: 29
End: 2024-09-04
Payer: MEDICARE

## 2024-09-10 ENCOUNTER — OFFICE VISIT (OUTPATIENT)
Dept: PRIMARY CARE | Facility: CLINIC | Age: 29
End: 2024-09-10
Payer: MEDICARE

## 2024-09-10 VITALS
WEIGHT: 175.2 LBS | DIASTOLIC BLOOD PRESSURE: 84 MMHG | HEART RATE: 85 BPM | HEIGHT: 62 IN | BODY MASS INDEX: 32.24 KG/M2 | RESPIRATION RATE: 16 BRPM | SYSTOLIC BLOOD PRESSURE: 128 MMHG

## 2024-09-10 DIAGNOSIS — F90.2 ATTENTION DEFICIT HYPERACTIVITY DISORDER (ADHD), COMBINED TYPE: Primary | ICD-10-CM

## 2024-09-10 DIAGNOSIS — Z51.81 MEDICATION MONITORING ENCOUNTER: ICD-10-CM

## 2024-09-10 DIAGNOSIS — N64.9 NIPPLE LESION: ICD-10-CM

## 2024-09-10 DIAGNOSIS — F32.A DEPRESSION, UNSPECIFIED DEPRESSION TYPE: ICD-10-CM

## 2024-09-10 PROCEDURE — 3008F BODY MASS INDEX DOCD: CPT | Performed by: INTERNAL MEDICINE

## 2024-09-10 PROCEDURE — 99214 OFFICE O/P EST MOD 30 MIN: CPT | Performed by: INTERNAL MEDICINE

## 2024-09-10 ASSESSMENT — PAIN SCALES - GENERAL: PAINLEVEL: 0-NO PAIN

## 2024-09-10 NOTE — PATIENT INSTRUCTIONS
Someone will contact you about setting up a virtual appointment for the psychiatry clinic for help with your ADD meds.    When you call for refill of Vyvanse, remind me that you want 70 mg.     Consider seeing a dermatologist about your nipple.     See me again in Dec or January 30 min if possible.

## 2024-09-10 NOTE — PROGRESS NOTES
"Subjective   Patient ID: Nhi Negrete is a 29 y.o. female who presents for Follow-up.    HPI     She is still having some trouble getting Vyvanse.   She has been taking her vyvanse around noon  She is working 4 pm to 4 am   It slows down at 9 or 10 pm   She's not productive when she is not at work, having trouble staying together    She does feel like she's depressed but she feels it is because of everything going on   She feels like it's not lasting 24  hours, and she has more responsibily and this adds to her anxiety as well  Said she feels she's more \"scatterbrained in my real life too\"     She is taking pregabalin and that is ok    She started working nights still as [employer]     She said that she noticed a greyish discoloraton of part of her nipple on her left breast and some tenderness of her nipple   No drainage    She smokes 1 ppd .  After her visit, I noted that she had some positive drug screens for THC  I did not address this with her today, since I saw it after her visit  Her screen done today was negative for THC, and her prior uds was also negative for thc.   The positves happened in 2022 and 2021.    Review of Systems      Current Outpatient Medications:     lisdexamfetamine (Vyvanse) 60 mg capsule, Take 1 capsule (60 mg) by mouth once daily., Disp: 30 capsule, Rfl: 0    pregabalin (Lyrica) 300 mg capsule, TAKE 1 CAPSULE BY MOUTH TWICE A DAY, Disp: 60 capsule, Rfl: 2    Objective   /84   Pulse 85   Resp 16   Ht 1.575 m (5' 2\")   Wt 79.5 kg (175 lb 3.2 oz)   BMI 32.04 kg/m²     Physical Exam  Cardiovascular:      Rate and Rhythm: Normal rate and regular rhythm.      Heart sounds: Normal heart sounds.   Pulmonary:      Effort: Pulmonary effort is normal.   Abdominal:      General: Abdomen is flat. Bowel sounds are normal.   Psychiatric:      Comments: Mood seems to be anxious when discussing her concerns over her current situation.          Assessment/Plan   Problem List Items " Addressed This Visit       ADHD (attention deficit hyperactivity disorder) - Primary  I [facility].  She has Vyvanse is the only med that seems to help her. We can increse her vyvanse to 70 mg. Since her visit she called for a refill, and I see that I still sent in for 60 mg. I changed this in the med list so I can send it the next time.     Relevant Orders    Referral to [facility]     Other Visit Diagnoses       Medication monitoring encounter    urine drug screen today was pos only for amphetamine, which is appropriate    Relevant Orders    Opiate/Opioid/Benzo Prescription Compliance (Completed)    OOB Internal Tracking (Completed)    Amphetamine Confirm, Urine (Completed)    Nipple lesion    I recommended she see dermatology    Depression, unspecified depression type    she did not want to talk to our [facility] management .  I encouraged her to follow up with the [facility].     See me in Dec or Jan for follow up.

## 2024-09-11 LAB
AMPHETAMINES UR QL SCN: ABNORMAL
BARBITURATES UR QL SCN: ABNORMAL
BZE UR QL SCN: ABNORMAL
CANNABINOIDS UR QL SCN: ABNORMAL
CREAT UR-MCNC: 31.5 MG/DL (ref 20–320)
PCP UR QL SCN: ABNORMAL

## 2024-09-14 LAB
AMPHET UR-MCNC: 815 NG/ML
MDA UR-MCNC: <200 NG/ML
MDEA UR-MCNC: <200 NG/ML
MDMA UR-MCNC: <200 NG/ML
METHAMPHET UR-MCNC: <200 NG/ML
PHENTERMINE UR CFM-MCNC: <200 NG/ML

## 2024-09-16 DIAGNOSIS — F90.9 ATTENTION DEFICIT HYPERACTIVITY DISORDER (ADHD), UNSPECIFIED ADHD TYPE: ICD-10-CM

## 2024-09-16 RX ORDER — LISDEXAMFETAMINE DIMESYLATE 60 MG/1
60 CAPSULE ORAL DAILY
Qty: 30 CAPSULE | Refills: 0 | Status: SHIPPED | OUTPATIENT
Start: 2024-09-16 | End: 2024-10-16

## 2024-09-19 RX ORDER — LISDEXAMFETAMINE DIMESYLATE 70 MG/1
70 CAPSULE ORAL EVERY MORNING
COMMUNITY

## 2024-10-16 ENCOUNTER — PATIENT MESSAGE (OUTPATIENT)
Dept: PRIMARY CARE | Facility: CLINIC | Age: 29
End: 2024-10-16
Payer: COMMERCIAL

## 2024-10-16 DIAGNOSIS — F90.9 ATTENTION DEFICIT HYPERACTIVITY DISORDER (ADHD), UNSPECIFIED ADHD TYPE: ICD-10-CM

## 2024-10-16 RX ORDER — LISDEXAMFETAMINE DIMESYLATE 60 MG/1
60 CAPSULE ORAL DAILY
Qty: 30 CAPSULE | Refills: 0 | Status: CANCELLED | OUTPATIENT
Start: 2024-10-16 | End: 2024-11-15

## 2024-10-16 NOTE — TELEPHONE ENCOUNTER
Refill    lisdexamfetamine (Vyvanse) 60 mg capsule  60 mg, Daily           Summary: Take 1 capsule (60 mg) by mouth   once daily        [facility]

## 2024-10-17 RX ORDER — LISDEXAMFETAMINE DIMESYLATE 60 MG/1
60 CAPSULE ORAL DAILY
Qty: 30 CAPSULE | Refills: 0 | Status: SHIPPED | OUTPATIENT
Start: 2024-10-17 | End: 2024-11-16

## 2024-10-22 DIAGNOSIS — G54.0 BRACHIAL PLEXUS DISORDERS: ICD-10-CM

## 2024-10-22 RX ORDER — PREGABALIN 300 MG/1
300 CAPSULE ORAL 2 TIMES DAILY
Qty: 60 CAPSULE | Refills: 2 | Status: SHIPPED | OUTPATIENT
Start: 2024-10-22

## 2024-10-22 NOTE — TELEPHONE ENCOUNTER
Refill    pregabalin (Lyrica) 300 mg capsule  300 mg, 2 times daily           Summary: TAKE 1 CAPSULE BY MOUTH    TWICE A DAY        [facility]

## 2024-11-15 ENCOUNTER — PATIENT MESSAGE (OUTPATIENT)
Dept: PRIMARY CARE | Facility: CLINIC | Age: 29
End: 2024-11-15
Payer: COMMERCIAL

## 2024-11-15 DIAGNOSIS — F90.2 ATTENTION DEFICIT HYPERACTIVITY DISORDER (ADHD), COMBINED TYPE: Primary | ICD-10-CM

## 2024-11-15 RX ORDER — LISDEXAMFETAMINE DIMESYLATE 70 MG/1
70 CAPSULE ORAL EVERY MORNING
Qty: 30 CAPSULE | Refills: 0 | Status: SHIPPED | OUTPATIENT
Start: 2024-11-15

## 2024-11-15 NOTE — PATIENT COMMUNICATION
Please refill    lisdexamfetamine (Vyvanse) 70 mg capsule   70 mg, Every morning   [facility]

## 2024-11-15 NOTE — TELEPHONE ENCOUNTER
Pt requesting refill of lisdexamfetamine 70.  I personally reviewed the OARRS report for this patient and found no concern for abuse, dependence or diversion. Refill sent per pt request.

## 2024-11-25 DIAGNOSIS — G54.0 BRACHIAL PLEXUS DISORDERS: ICD-10-CM

## 2024-11-25 RX ORDER — PREGABALIN 300 MG/1
300 CAPSULE ORAL 2 TIMES DAILY
Qty: 60 CAPSULE | Refills: 2 | Status: SHIPPED | OUTPATIENT
Start: 2024-11-25

## 2024-11-25 NOTE — TELEPHONE ENCOUNTER
Pt requesting refill for pregabalin. I personally reviewed the OARRS report for this patient and found no concern for abuse, dependence or diversion. Refill sent per pt request.

## 2024-12-20 ENCOUNTER — PATIENT MESSAGE (OUTPATIENT)
Dept: PRIMARY CARE | Facility: CLINIC | Age: 29
End: 2024-12-20
Payer: COMMERCIAL

## 2024-12-20 DIAGNOSIS — F90.2 ATTENTION DEFICIT HYPERACTIVITY DISORDER (ADHD), COMBINED TYPE: ICD-10-CM

## 2024-12-20 RX ORDER — LISDEXAMFETAMINE DIMESYLATE 70 MG/1
70 CAPSULE ORAL EVERY MORNING
Qty: 30 CAPSULE | Refills: 0 | Status: SHIPPED | OUTPATIENT
Start: 2024-12-20

## 2024-12-20 NOTE — TELEPHONE ENCOUNTER
Pt requesting refill of lisdexamfetamine 70 mg. I personally reviewed the OARRS report for this patient and found no concern for abuse, dependence or diversion. Refill sent per pt request.

## 2024-12-20 NOTE — PATIENT COMMUNICATION
Please refill    lisdexamfetamine (Vyvanse) 70 mg capsule   70 mg, Every morning   [facility]

## 2025-01-30 ENCOUNTER — TELEPHONE (OUTPATIENT)
Dept: PRIMARY CARE | Facility: CLINIC | Age: 30
End: 2025-01-30
Payer: COMMERCIAL

## 2025-01-30 DIAGNOSIS — F90.2 ATTENTION DEFICIT HYPERACTIVITY DISORDER (ADHD), COMBINED TYPE: ICD-10-CM

## 2025-01-30 RX ORDER — LISDEXAMFETAMINE DIMESYLATE 70 MG/1
70 CAPSULE ORAL EVERY MORNING
Qty: 30 CAPSULE | Refills: 0 | Status: SHIPPED | OUTPATIENT
Start: 2025-01-30

## 2025-01-30 NOTE — TELEPHONE ENCOUNTER
Pt requesting refill of Vyvanse. I personally reviewed the OARRS report for this patient and found no concern for abuse, dependence or diversion. Refill sent per pt request.

## 2025-03-11 ENCOUNTER — PATIENT MESSAGE (OUTPATIENT)
Dept: PRIMARY CARE | Facility: CLINIC | Age: 30
End: 2025-03-11
Payer: COMMERCIAL

## 2025-03-11 DIAGNOSIS — F90.2 ATTENTION DEFICIT HYPERACTIVITY DISORDER (ADHD), COMBINED TYPE: ICD-10-CM

## 2025-03-11 RX ORDER — LISDEXAMFETAMINE DIMESYLATE 70 MG/1
70 CAPSULE ORAL EVERY MORNING
Qty: 30 CAPSULE | Refills: 0 | Status: SHIPPED | OUTPATIENT
Start: 2025-03-11

## 2025-03-12 NOTE — TELEPHONE ENCOUNTER
Pt requesting refill for vyvanse. I personally reviewed the OARRS report for this patient and found no concern for abuse, dependence or diversion. Refill sent per pt request.

## 2025-03-19 DIAGNOSIS — G54.0 BRACHIAL PLEXUS DISORDERS: ICD-10-CM

## 2025-03-20 ENCOUNTER — PATIENT MESSAGE (OUTPATIENT)
Dept: PRIMARY CARE | Facility: CLINIC | Age: 30
End: 2025-03-20
Payer: COMMERCIAL

## 2025-03-20 DIAGNOSIS — G54.0 BRACHIAL PLEXUS DISORDERS: ICD-10-CM

## 2025-03-20 NOTE — TELEPHONE ENCOUNTER
Mychart message sent in from [facility]    Please refill  pregabalin (Lyrica) 300 mg capsule   300 mg, 2 times daily

## 2025-03-21 RX ORDER — PREGABALIN 300 MG/1
300 CAPSULE ORAL 2 TIMES DAILY
Qty: 60 CAPSULE | Refills: 2 | Status: SHIPPED | OUTPATIENT
Start: 2025-03-21

## 2025-03-21 RX ORDER — PREGABALIN 300 MG/1
300 CAPSULE ORAL 2 TIMES DAILY
Qty: 60 CAPSULE | Refills: 2 | Status: SHIPPED | OUTPATIENT
Start: 2025-03-21 | End: 2025-03-21 | Stop reason: SDUPTHER

## 2025-04-17 DIAGNOSIS — F90.2 ATTENTION DEFICIT HYPERACTIVITY DISORDER (ADHD), COMBINED TYPE: ICD-10-CM

## 2025-04-17 RX ORDER — LISDEXAMFETAMINE DIMESYLATE 70 MG/1
70 CAPSULE ORAL EVERY MORNING
Qty: 30 CAPSULE | Refills: 0 | Status: SHIPPED | OUTPATIENT
Start: 2025-04-17

## 2025-05-19 DIAGNOSIS — F90.2 ATTENTION DEFICIT HYPERACTIVITY DISORDER (ADHD), COMBINED TYPE: ICD-10-CM

## 2025-05-19 RX ORDER — LISDEXAMFETAMINE DIMESYLATE 70 MG/1
70 CAPSULE ORAL EVERY MORNING
Qty: 30 CAPSULE | Refills: 0 | Status: SHIPPED | OUTPATIENT
Start: 2025-05-19

## 2025-05-19 NOTE — TELEPHONE ENCOUNTER
Pt requ refill lisdexamfetamine. I personally reviewed the OARRS report for this patient and found no concern for abuse, dependence or diversion. Refill sent per pt request.

## 2025-06-10 DIAGNOSIS — G54.0 BRACHIAL PLEXUS DISORDERS: ICD-10-CM

## 2025-06-10 RX ORDER — PREGABALIN 300 MG/1
300 CAPSULE ORAL 2 TIMES DAILY
Qty: 60 CAPSULE | Refills: 2 | Status: SHIPPED | OUTPATIENT
Start: 2025-06-10

## 2025-06-10 NOTE — TELEPHONE ENCOUNTER
Pt requesting refill of pregabalin. I personally reviewed the [facility] report for this patient and found no concern for abuse, dependence or diversion. Refill sent per pt request.

## 2025-06-20 ENCOUNTER — PATIENT MESSAGE (OUTPATIENT)
Dept: PRIMARY CARE | Facility: CLINIC | Age: 30
End: 2025-06-20
Payer: COMMERCIAL

## 2025-06-20 DIAGNOSIS — F90.2 ATTENTION DEFICIT HYPERACTIVITY DISORDER (ADHD), COMBINED TYPE: ICD-10-CM

## 2025-06-20 RX ORDER — LISDEXAMFETAMINE DIMESYLATE 70 MG/1
70 CAPSULE ORAL EVERY MORNING
Qty: 30 CAPSULE | Refills: 0 | Status: SHIPPED | OUTPATIENT
Start: 2025-06-20 | End: 2025-06-20 | Stop reason: SDUPTHER

## 2025-06-20 RX ORDER — LISDEXAMFETAMINE DIMESYLATE 70 MG/1
70 CAPSULE ORAL EVERY MORNING
Qty: 30 CAPSULE | Refills: 0 | Status: SHIPPED | OUTPATIENT
Start: 2025-06-20

## 2025-06-20 NOTE — TELEPHONE ENCOUNTER
Patient called back to make sure you sent the     lisdexamfetamine (Vyvanse) 70 mg capsule   To the pharmacy

## 2025-07-21 DIAGNOSIS — F90.2 ATTENTION DEFICIT HYPERACTIVITY DISORDER (ADHD), COMBINED TYPE: ICD-10-CM

## 2025-07-21 NOTE — TELEPHONE ENCOUNTER
Refill, can wait until tomorrow     lisdexamfetamine (Vyvanse) 70 mg capsule   70 mg, Every morning   [facility]

## 2025-07-22 RX ORDER — LISDEXAMFETAMINE DIMESYLATE 70 MG/1
70 CAPSULE ORAL EVERY MORNING
Qty: 30 CAPSULE | Refills: 0 | Status: SHIPPED | OUTPATIENT
Start: 2025-07-22

## 2025-08-26 DIAGNOSIS — F90.2 ATTENTION DEFICIT HYPERACTIVITY DISORDER (ADHD), COMBINED TYPE: ICD-10-CM

## 2025-08-28 ENCOUNTER — PATIENT MESSAGE (OUTPATIENT)
Dept: PRIMARY CARE | Facility: CLINIC | Age: 30
End: 2025-08-28
Payer: COMMERCIAL

## 2025-08-28 DIAGNOSIS — F90.2 ATTENTION DEFICIT HYPERACTIVITY DISORDER (ADHD), COMBINED TYPE: ICD-10-CM

## 2025-08-28 RX ORDER — LISDEXAMFETAMINE DIMESYLATE 70 MG/1
70 CAPSULE ORAL EVERY MORNING
Qty: 30 CAPSULE | Refills: 0 | Status: SHIPPED | OUTPATIENT
Start: 2025-08-28

## 2025-08-28 RX ORDER — LISDEXAMFETAMINE DIMESYLATE 70 MG/1
70 CAPSULE ORAL EVERY MORNING
Qty: 30 CAPSULE | Refills: 0 | Status: SHIPPED | OUTPATIENT
Start: 2025-08-28 | End: 2025-08-28 | Stop reason: SDUPTHER

## 2025-09-03 DIAGNOSIS — G54.0 BRACHIAL PLEXUS DISORDERS: ICD-10-CM

## 2025-09-03 RX ORDER — PREGABALIN 300 MG/1
300 CAPSULE ORAL 2 TIMES DAILY
Qty: 60 CAPSULE | Refills: 2 | Status: SHIPPED | OUTPATIENT
Start: 2025-09-03